# Patient Record
Sex: FEMALE | Race: ASIAN | NOT HISPANIC OR LATINO | Employment: FULL TIME | URBAN - METROPOLITAN AREA
[De-identification: names, ages, dates, MRNs, and addresses within clinical notes are randomized per-mention and may not be internally consistent; named-entity substitution may affect disease eponyms.]

---

## 2024-05-29 ENCOUNTER — TELEPHONE (OUTPATIENT)
Dept: INTERNAL MEDICINE | Facility: HOSPITAL | Age: 34
End: 2024-05-29

## 2024-05-29 DIAGNOSIS — R23.3 PETECHIAL RASH: Primary | ICD-10-CM

## 2024-07-16 ENCOUNTER — APPOINTMENT (OUTPATIENT)
Dept: PRIMARY CARE | Facility: CLINIC | Age: 34
End: 2024-07-16

## 2024-08-30 ENCOUNTER — TELEPHONE (OUTPATIENT)
Dept: INTERNAL MEDICINE | Facility: HOSPITAL | Age: 34
End: 2024-08-30

## 2024-08-30 DIAGNOSIS — R42 DIZZINESS: Primary | ICD-10-CM

## 2024-08-30 RX ORDER — MECLIZINE HCL 12.5 MG 12.5 MG/1
12.5 TABLET ORAL 3 TIMES DAILY PRN
Qty: 90 TABLET | Refills: 0 | Status: SHIPPED | OUTPATIENT
Start: 2024-08-30 | End: 2024-09-29

## 2024-10-06 DIAGNOSIS — K92.1 MELENA: Primary | ICD-10-CM

## 2024-10-09 ENCOUNTER — APPOINTMENT (OUTPATIENT)
Dept: GASTROENTEROLOGY | Facility: CLINIC | Age: 34
End: 2024-10-09

## 2024-10-09 PROBLEM — G93.81 MESIAL TEMPORAL SCLEROSIS: Status: ACTIVE | Noted: 2020-12-21

## 2024-10-09 PROBLEM — L70.8 OTHER ACNE: Status: ACTIVE | Noted: 2019-02-23

## 2024-10-09 PROBLEM — A17.0: Status: ACTIVE | Noted: 2020-10-29

## 2024-10-09 PROBLEM — I65.22 INTRACRANIAL CAROTID STENOSIS, LEFT: Status: ACTIVE | Noted: 2020-08-27

## 2024-10-09 PROBLEM — G40.209 FOCAL EPILEPSY WITH IMPAIRMENT OF CONSCIOUSNESS (MULTI): Status: ACTIVE | Noted: 2020-09-10

## 2024-11-13 ENCOUNTER — APPOINTMENT (OUTPATIENT)
Facility: CLINIC | Age: 34
End: 2024-11-13

## 2024-11-13 DIAGNOSIS — Z87.19 HISTORY OF MELENA: ICD-10-CM

## 2024-11-13 DIAGNOSIS — K92.1 MELENA: Primary | ICD-10-CM

## 2024-11-13 ASSESSMENT — ENCOUNTER SYMPTOMS
ANAL BLEEDING: 0
APPETITE CHANGE: 0
UNEXPECTED WEIGHT CHANGE: 0
ABDOMINAL PAIN: 0

## 2024-11-13 NOTE — PROGRESS NOTES
Chief Complaint:  Chief Complaint   Patient presents with    Black or Bloody Stool       Hx of mesial temporal sclerosis, epilepsy on aspirin 81mg. Here for two separate episodes of melena in the beginning of the year described as dark tarry stool. Continuous for 2-3 days. stopped aspirin and started pantoprazole 40mg.     Since those episodes has started back on aspiring and has been off pantoprazole for a couple of months.   No recurrence of melena.   Denies other GI sxs such as weight loss or abdominal pain  No fm hx GI disease     No recent labs    Virtual or Telephone Consent    An interactive audio and video telecommunication system which permits real time communications between the patient (at the originating site) and provider (at the distant site) was utilized to provide this telehealth service.   Verbal consent was requested and obtained from Clayton Bolaños on this date, 11/13/24 for a telehealth visit.                  Review of Systems   Constitutional:  Negative for appetite change and unexpected weight change.   Gastrointestinal:  Negative for abdominal pain and anal bleeding.     No family history on file.    Medications    Current Outpatient Medications:     aspirin 81 mg EC tablet, Take 1 tablet (81 mg) by mouth once daily., Disp: , Rfl:     atorvastatin (Lipitor) 20 mg tablet, Take 1 tablet (20 mg) by mouth once daily at bedtime., Disp: , Rfl:     folic acid (Folvite) 1 mg tablet, Take 1 tablet (1 mg) by mouth once daily., Disp: , Rfl:     levETIRAcetam (Keppra) 1,000 mg tablet, Take 1 tablet (1,000 mg) by mouth twice a day., Disp: , Rfl:     norethindrone-e.estradioL-iron (Microgestin FE 1.5/30) 1.5 mg-30 mcg (21)/75 mg (7) tablet, Take 1 tablet by mouth once daily., Disp: , Rfl:     spironolactone (Aldactone) 100 mg tablet, Take 1 tablet (100 mg) by mouth once daily., Disp: , Rfl:     Vitals  There were no vitals taken for this visit.    Physical Exam  Constitutional:       General: She is not in  "acute distress.     Appearance: Normal appearance.   HENT:      Head: Normocephalic.      Nose: Nose normal.   Eyes:      General: No scleral icterus.     Extraocular Movements: Extraocular movements intact.      Conjunctiva/sclera: Conjunctivae normal.   Pulmonary:      Effort: Pulmonary effort is normal.   Skin:     General: Skin is warm.      Coloration: Skin is not jaundiced.   Neurological:      Mental Status: She is alert.   Psychiatric:         Mood and Affect: Mood normal.           Labs:  No results found for: \"AFP\"No results found for: \"ASMAB\", \"MITOAB\"No results found for: \"RIMA\"No results found for: \"ASMAB\", \"MITOAB\"No results found for: \"OQNWJPHT00\"No results found for: \"HEPCAB\"No results found for: \"HEPATOT\", \"HEPAIGM\", \"HEPBCIGM\", \"HEPBCAB\", \"HBEAG\", \"HEPCAB\"No results found for: \"HIV1X2\"No results found for: \"IRON\", \"TIBC\", \"FERRITIN\"No results found for: \"INR\", \"PROTIME\"No results found for: \"TSH\"    A/P   Clayton was seen today for black or bloody stool.  Diagnoses and all orders for this visit:  Melena (Primary)  -     CBC; Future  -     H. Pylori Antigen, Stool; Future  -     Basic metabolic panel; Future  History of melena     Problem List Items Addressed This Visit             ICD-10-CM    History of melena Z87.19     2 episodes of melena while on aspiring, resolved with PPI use and aspirin holiday. Currently on aspirin and off PPI with no recurrence of melena. No other GI sxs. Likely NSAID gastritis/PUD. Given no further melena low concern for ongoing gastritis. Could also have H.pylori making patient more at risk for PUD in setting of NSAID use.   - given ongoing need for aspirin recommend checking for H,pylori which can be done via EGD or via stool testing.   - patient prefers stool testing at this time which is reasonable given no further melena  - check CBC.BMP   - if concerning anemia recommend EGD  - check stool h.pylori          Other Visit Diagnoses         Codes    Melena    -  Primary " K92.1    Relevant Orders    CBC    H. Pylori Antigen, Stool    Basic metabolic panel

## 2024-11-13 NOTE — ASSESSMENT & PLAN NOTE
2 episodes of melena while on aspiring, resolved with PPI use and aspirin holiday. Currently on aspirin and off PPI with no recurrence of melena. No other GI sxs. Likely NSAID gastritis/PUD. Given no further melena low concern for ongoing gastritis. Could also have H.pylori making patient more at risk for PUD in setting of NSAID use.   - given ongoing need for aspirin recommend checking for H,pylori which can be done via EGD or via stool testing.   - patient prefers stool testing at this time which is reasonable given no further melena  - check CBC.BMP   - if concerning anemia recommend EGD  - check stool h.pylori

## 2024-11-23 DIAGNOSIS — L70.0 ACNE VULGARIS: Primary | ICD-10-CM

## 2024-11-23 RX ORDER — SPIRONOLACTONE 100 MG/1
100 TABLET, FILM COATED ORAL DAILY
Qty: 90 TABLET | Refills: 3 | Status: SHIPPED | OUTPATIENT
Start: 2024-11-23

## 2025-02-20 DIAGNOSIS — I65.22 INTRACRANIAL CAROTID STENOSIS, LEFT: Primary | ICD-10-CM

## 2025-02-20 RX ORDER — ATORVASTATIN CALCIUM 20 MG/1
20 TABLET, FILM COATED ORAL NIGHTLY
Qty: 30 TABLET | Refills: 11 | Status: SHIPPED
Start: 2025-02-20 | End: 2025-02-20

## 2025-02-20 RX ORDER — ATORVASTATIN CALCIUM 20 MG/1
20 TABLET, FILM COATED ORAL NIGHTLY
Qty: 90 TABLET | Refills: 3 | Status: SHIPPED | OUTPATIENT
Start: 2025-02-20 | End: 2026-02-20

## 2025-05-21 DIAGNOSIS — L30.9 DERMATITIS: Primary | ICD-10-CM

## 2025-05-21 RX ORDER — TRIAMCINOLONE ACETONIDE 1 MG/G
OINTMENT TOPICAL
Qty: 30 G | Refills: 0 | Status: SHIPPED | OUTPATIENT
Start: 2025-05-21